# Patient Record
Sex: FEMALE
[De-identification: names, ages, dates, MRNs, and addresses within clinical notes are randomized per-mention and may not be internally consistent; named-entity substitution may affect disease eponyms.]

---

## 2018-04-23 PROBLEM — Z00.00 ENCOUNTER FOR PREVENTIVE HEALTH EXAMINATION: Status: ACTIVE | Noted: 2018-04-23

## 2018-05-04 ENCOUNTER — APPOINTMENT (OUTPATIENT)
Dept: ENDOCRINOLOGY | Facility: CLINIC | Age: 28
End: 2018-05-04
Payer: COMMERCIAL

## 2018-05-04 ENCOUNTER — APPOINTMENT (OUTPATIENT)
Dept: ENDOCRINOLOGY | Facility: CLINIC | Age: 28
End: 2018-05-04

## 2018-05-04 VITALS
SYSTOLIC BLOOD PRESSURE: 117 MMHG | DIASTOLIC BLOOD PRESSURE: 78 MMHG | WEIGHT: 100 LBS | HEIGHT: 61.5 IN | HEART RATE: 101 BPM | BODY MASS INDEX: 18.64 KG/M2

## 2018-05-04 DIAGNOSIS — D68.51 ACTIVATED PROTEIN C RESISTANCE: ICD-10-CM

## 2018-05-04 PROCEDURE — 99204 OFFICE O/P NEW MOD 45 MIN: CPT

## 2018-05-07 ENCOUNTER — MOBILE ON CALL (OUTPATIENT)
Age: 28
End: 2018-05-07

## 2018-05-08 LAB
T3 SERPL-MCNC: 130 NG/DL
T4 FREE SERPL-MCNC: 1.4 NG/DL
T4 SERPL-MCNC: 7.7 UG/DL
THYROGLOB AB SERPL-ACNC: <20 IU/ML
THYROPEROXIDASE AB SERPL IA-ACNC: <10 IU/ML
TSH SERPL-ACNC: 2.37 UIU/ML

## 2018-08-02 ENCOUNTER — RX RENEWAL (OUTPATIENT)
Age: 28
End: 2018-08-02

## 2018-12-06 ENCOUNTER — RX RENEWAL (OUTPATIENT)
Age: 28
End: 2018-12-06

## 2018-12-19 ENCOUNTER — APPOINTMENT (OUTPATIENT)
Dept: ENDOCRINOLOGY | Facility: CLINIC | Age: 28
End: 2018-12-19
Payer: COMMERCIAL

## 2018-12-19 VITALS
BODY MASS INDEX: 21.25 KG/M2 | WEIGHT: 114 LBS | DIASTOLIC BLOOD PRESSURE: 69 MMHG | HEIGHT: 61.5 IN | SYSTOLIC BLOOD PRESSURE: 108 MMHG | HEART RATE: 104 BPM

## 2018-12-19 PROCEDURE — 99214 OFFICE O/P EST MOD 30 MIN: CPT

## 2018-12-20 LAB
T4 FREE SERPL-MCNC: 1.2 NG/DL
TSH SERPL-ACNC: 0.95 UIU/ML

## 2019-01-08 ENCOUNTER — RX RENEWAL (OUTPATIENT)
Age: 29
End: 2019-01-08

## 2019-04-17 ENCOUNTER — APPOINTMENT (OUTPATIENT)
Dept: ENDOCRINOLOGY | Facility: CLINIC | Age: 29
End: 2019-04-17
Payer: COMMERCIAL

## 2019-04-17 VITALS
SYSTOLIC BLOOD PRESSURE: 111 MMHG | HEIGHT: 61.5 IN | WEIGHT: 106 LBS | HEART RATE: 95 BPM | BODY MASS INDEX: 19.76 KG/M2 | DIASTOLIC BLOOD PRESSURE: 70 MMHG

## 2019-04-17 PROCEDURE — 99214 OFFICE O/P EST MOD 30 MIN: CPT

## 2019-04-17 RX ORDER — CALCIUM CITRATE/VITAMIN D3 315MG-6.25
TABLET ORAL
Refills: 0 | Status: DISCONTINUED | COMMUNITY
End: 2019-04-17

## 2019-04-17 RX ORDER — LEVOTHYROXINE SODIUM 0.05 MG/1
50 TABLET ORAL
Qty: 34 | Refills: 1 | Status: DISCONTINUED | COMMUNITY
Start: 2018-08-01 | End: 2019-04-17

## 2019-04-18 LAB
T4 FREE SERPL-MCNC: 1.3 NG/DL
TSH SERPL-ACNC: 1.64 UIU/ML

## 2019-04-18 NOTE — PHYSICAL EXAM
[Alert] : alert [No Acute Distress] : no acute distress [Healthy Appearance] : healthy appearance [Normal Sclera/Conjunctiva] : normal sclera/conjunctiva [Normal Oropharynx] : the oropharynx was normal [No Neck Mass] : no neck mass was observed [Supple] : the neck was supple [Thyroid Not Enlarged] : the thyroid was not enlarged [No LAD] : no lymphadenopathy [No Thyroid Nodules] : there were no palpable thyroid nodules [Normal Rate and Effort] : normal respiratory rhythm and effort [Clear to Auscultation] : lungs were clear to auscultation bilaterally [Normal Rate] : heart rate was normal  [Normal S1, S2] : normal S1 and S2 [Regular Rhythm] : with a regular rhythm [No Edema] : there was no peripheral edema [No Stigmata of Cushings Syndrome] : no stigmata of cushings syndrome [Normal Gait] : normal gait [Normal Insight/Judgement] : insight and judgment were intact [Kyphosis] : no kyphosis present [Acanthosis Nigricans] : no acanthosis nigricans [de-identified] : no moon facies, no supraclavicular fat pads=

## 2019-04-18 NOTE — DATA REVIEWED
[FreeTextEntry1] : Laboratories (March 30, 2018) reviewed and significant for:\par TSH 5.240 mIU/mL\par Free T4 1.41 ng/dL

## 2019-04-18 NOTE — ASSESSMENT
[FreeTextEntry1] : Subclinical hypothyroidism. She was diagnosed with subclinical hypothyroidism at around 6 weeks gestation. She was clinically and biochemically euthyroid on her regimen of levothyroxine during pregnancy. She was induced March 14, 2019 and gave birth to a healthy baby girl. She stopped taking levothyroxine on that date. She is not producing sufficient milk and is supplementing with formula. Her doctor and lactation consultant encouraged a visit with me to check thyroid function.  We will check TSH and free T4 today. Further management pending results.\par \par CC:\par Dr. Pasquale Hilton, Fax [phone]

## 2019-04-18 NOTE — ADDENDUM
[FreeTextEntry1] : Recent thyroid function tests within range off levothyroxine; no indication to restart at this time. I discussed these reassuring results with Ms. Wright. She will need levothyroxine if she becomes pregnant again in the future and was advised to call me immediately as needed. 4/18/19

## 2019-04-18 NOTE — HISTORY OF PRESENT ILLNESS
[FreeTextEntry1] : Ms. Membreno is a 28 year-old woman with a history of factor V deficiency presenting for follow-up of subclinical hypothyroidism diagnosed in pregnancy. She saw me for an initial visit in May 2018 and last in December.\par \par Subclinical hypothyroidism in pregnancy.\par She was noted to have an elevated TSH of 5.240 mIU/mL with normal free T4 in April 2018, about 2 weeks after D&C for miscarriage.\par Her mother states she took levothyroxine for one year starting at age 8 for growth issues. No subsequent need for levothyroxine in childhood.\par At her initial visit in May 2018, TSH was 2.37 uIU/mL with normal T4/T3 and negative thyroid antibodies. We did not start levothyroxine at that time. \par She called me in August 2018 that TSH was 6.720 uIU/mL at approximately 6 weeks gestation and we started levothyroxine 50 mcg daily. We adjusted her dose to levothyroxine 50 mcg, 1 pill 6 days/week and 2 pills 1 day/week for TSH 2.520 uIU/mL on this dose. TSH values were subsequently at goal throughout pregnancy.\par She was taking levothyroxine in the morning, on an empty stomach, with plain water, and waiting at least 30 minutes before eating. She was taking calcium and a prenatal vitamin and  from levothyroxine by at least 4 hours.\par No history of radiation exposure.\par No family history of thyroid or other autoimmune disease.\par \par Interim History \par See telephone note for interim history. She was induced March 14, 2019 and gave birth to a healthy baby girl. She stopped taking levothyroxine on that date.\par She was producing little milk. We discussed restarting levothyroxine on March 20, however, her milk came in that night and she did not restart. \par She is not producing sufficient milk and is supplementing with formula. Her doctor and lactation consultant encouraged a visit with me to check thyroid function. \par She is now only 6 pounds above her prepregnancy weight. No palpitations, diarrhea/constipation, hair/skin changes, depression/anxiety. \par Medical and surgical history, medications, allergies, social and family history reviewed and updated as needed.

## 2020-05-01 ENCOUNTER — APPOINTMENT (OUTPATIENT)
Dept: ENDOCRINOLOGY | Facility: CLINIC | Age: 30
End: 2020-05-01
Payer: COMMERCIAL

## 2020-05-01 VITALS
SYSTOLIC BLOOD PRESSURE: 117 MMHG | DIASTOLIC BLOOD PRESSURE: 74 MMHG | WEIGHT: 96 LBS | HEIGHT: 61.5 IN | BODY MASS INDEX: 17.89 KG/M2 | HEART RATE: 104 BPM

## 2020-05-01 PROCEDURE — 36415 COLL VENOUS BLD VENIPUNCTURE: CPT

## 2020-05-01 PROCEDURE — 99214 OFFICE O/P EST MOD 30 MIN: CPT | Mod: 25

## 2020-05-01 RX ORDER — ENOXAPARIN SODIUM 300 MG/3ML
INJECTION INTRAVENOUS; SUBCUTANEOUS
Refills: 0 | Status: DISCONTINUED | COMMUNITY
End: 2020-05-01

## 2020-05-08 LAB
25(OH)D3 SERPL-MCNC: 29.2 NG/ML
ALBUMIN SERPL ELPH-MCNC: 5.2 G/DL
ALP BLD-CCNC: 109 U/L
ALT SERPL-CCNC: 9 U/L
ANION GAP SERPL CALC-SCNC: 15 MMOL/L
AST SERPL-CCNC: 13 U/L
BASOPHILS # BLD AUTO: 0.05 K/UL
BASOPHILS NFR BLD AUTO: 0.9 %
BILIRUB SERPL-MCNC: 1.9 MG/DL
BUN SERPL-MCNC: 12 MG/DL
CALCIUM SERPL-MCNC: 9.7 MG/DL
CHLORIDE SERPL-SCNC: 101 MMOL/L
CO2 SERPL-SCNC: 23 MMOL/L
CREAT SERPL-MCNC: 0.56 MG/DL
EOSINOPHIL # BLD AUTO: 0.07 K/UL
EOSINOPHIL NFR BLD AUTO: 1.2 %
GLUCOSE SERPL-MCNC: 50 MG/DL
HCT VFR BLD CALC: 45 %
HGB BLD-MCNC: 14.7 G/DL
IMM GRANULOCYTES NFR BLD AUTO: 0.2 %
LYMPHOCYTES # BLD AUTO: 2.3 K/UL
LYMPHOCYTES NFR BLD AUTO: 39.9 %
MAN DIFF?: NORMAL
MCHC RBC-ENTMCNC: 29.1 PG
MCHC RBC-ENTMCNC: 32.7 GM/DL
MCV RBC AUTO: 88.9 FL
MONOCYTES # BLD AUTO: 0.31 K/UL
MONOCYTES NFR BLD AUTO: 5.4 %
NEUTROPHILS # BLD AUTO: 3.03 K/UL
NEUTROPHILS NFR BLD AUTO: 52.4 %
PLATELET # BLD AUTO: 292 K/UL
POTASSIUM SERPL-SCNC: 3.9 MMOL/L
PROT SERPL-MCNC: 7.5 G/DL
RBC # BLD: 5.06 M/UL
RBC # FLD: 11.9 %
SODIUM SERPL-SCNC: 139 MMOL/L
T4 FREE SERPL-MCNC: 1.5 NG/DL
TSH SERPL-ACNC: 3.53 UIU/ML
VIT B12 SERPL-MCNC: 520 PG/ML
WBC # FLD AUTO: 5.77 K/UL

## 2021-02-08 NOTE — HISTORY OF PRESENT ILLNESS
[FreeTextEntry1] : Ms. Membreno is a 29 year-old woman with a history of factor V deficiency presenting for follow-up of subclinical hypothyroidism diagnosed during her last pregnancy. She saw me for an initial visit in May 2018 and last in April 2019.\par \par Subclinical hypothyroidism.\par Her mother stated she took levothyroxine for one year starting at age 6 for growth issues. No subsequent need for levothyroxine in childhood.\par She was noted to have an elevated TSH of 5.240 mIU/mL with normal free T4 in April 2018, about two weeks after D&C for miscarriage.\par At her initial visit in May 2018, TSH was 2.37 uIU/mL with normal T4/T3 and negative thyroid antibodies. We did not start levothyroxine at that time. \par She called me in August 2018 that TSH was 6.720 uIU/mL at approximately 6 weeks gestation and we started levothyroxine 50 mcg daily. We adjusted her dose to levothyroxine 50 mcg, 1 pill 6 days/week and 2 pills 1 day/week for TSH 2.520 uIU/mL on this dose. TSH values were subsequently at goal throughout pregnancy. We discontinued levothyroxine in the postpartum period, and she was subsequently clinically and biochemically euthyroid. \par No history of radiation exposure.\par No family history of thyroid or other autoimmune disease.\par \par Interim History \par Her baby girl is 14 months old. She is crawling and pulling herself up but not yet walking. She wants to plan to become pregnant again. \par No change in weight, palpitations, diarrhea/constipation, hair/skin changes, depression/anxiety. \par Medical and surgical history, medications, allergies, social and family history reviewed and updated as needed.

## 2021-02-08 NOTE — ADDENDUM
[FreeTextEntry1] : Recent test results as below; discussed with Ms. Wright. TSH 3.53 uIU/mL; no compelling indication to restart levothyroxine at this time but would restart immediately if she becomes pregnant. Glucose low, likely due to prolonged processing time in the laboratory since she was asymptomatic of hypoglycemia at her appointment. Total bilirubin borderline elevated and recommend follow-up with primary care. Albumin borderline elevated but not of concern. ALT borderline low but not of concern. Other test results within range. 5/08/20\par \par Ms. Wright has been trying to become pregnant since March and has not been successful. Her gynecologist recently checked TSH and noted it to be above 2.5 uIU/mL but within normal; he attributed her issues with becoming pregnant to her thyroid. We can certainly restart levothyroxine 50 mcg daily for now with plan to repeat TSH in 6 weeks; I advised she continue to follow up regarding other potential causes for difficulty conceiving. 9/29/20\par \par Recent TSH within range on levothyroxine 50 mcg daily; discussed with Ms. Wright. We will continue this regimen for now. I advised her to call me immediately if she becomes pregnant. 11/11/10\par \par Ms. Wright called to let me know she is pregnant. We will adjust levothyroxine to 50 mcg, 1 pill 5 days/week and 2 pills 2 days/week (average daily dose 64 mcg) and plan to repeat TSH in 5 weeks. 12/11/20\par \par Ms. Wright had a miscarriage at 5 weeks. She is pregnant again, although with low heartbeat. Recent TSH 0.674 uIU/mL. 2/08/21\par \par Laboratories (November 10, 2020) reviewed and significant for: \par TSH 0.923 uIU/mL

## 2021-02-08 NOTE — PHYSICAL EXAM
[Alert] : alert [Healthy Appearance] : healthy appearance [No Acute Distress] : no acute distress [Normal Sclera/Conjunctiva] : normal sclera/conjunctiva [Normal Oropharynx] : the oropharynx was normal [No Neck Mass] : no neck mass was observed [No LAD] : no lymphadenopathy [Supple] : the neck was supple [Thyroid Not Enlarged] : the thyroid was not enlarged [No Thyroid Nodules] : no palpable thyroid nodules [No Respiratory Distress] : no respiratory distress [Clear to Auscultation] : lungs were clear to auscultation bilaterally [Normal S1, S2] : normal S1 and S2 [Normal Rate] : heart rate was normal [Regular Rhythm] : with a regular rhythm [No Stigmata of Cushings Syndrome] : no stigmata of Cushings Syndrome [Normal Insight/Judgement] : insight and judgment were intact [Kyphosis] : no kyphosis present [Acanthosis Nigricans] : no acanthosis nigricans [de-identified] : no moon facies, no supraclavicular fat pads

## 2021-02-08 NOTE — ASSESSMENT
[FreeTextEntry1] : Subclinical hypothyroidism. She was diagnosed with subclinical hypothyroidism during her last pregnancy. We discontinued levothyroxine in the postpartum period, and she was subsequently clinically and biochemically euthyroid. She is considering another pregnancy. We will check thyroid function tests now. She will call the office immediately if she becomes pregnant.

## 2021-02-09 ENCOUNTER — NON-APPOINTMENT (OUTPATIENT)
Age: 31
End: 2021-02-09

## 2021-03-10 ENCOUNTER — RX RENEWAL (OUTPATIENT)
Age: 31
End: 2021-03-10

## 2021-04-14 ENCOUNTER — APPOINTMENT (OUTPATIENT)
Dept: ENDOCRINOLOGY | Facility: CLINIC | Age: 31
End: 2021-04-14
Payer: COMMERCIAL

## 2021-04-14 VITALS
DIASTOLIC BLOOD PRESSURE: 60 MMHG | WEIGHT: 98 LBS | BODY MASS INDEX: 18.27 KG/M2 | HEIGHT: 61.5 IN | SYSTOLIC BLOOD PRESSURE: 92 MMHG | HEART RATE: 92 BPM

## 2021-04-14 PROCEDURE — 99072 ADDL SUPL MATRL&STAF TM PHE: CPT

## 2021-04-14 PROCEDURE — 99214 OFFICE O/P EST MOD 30 MIN: CPT

## 2021-04-15 LAB
T4 FREE SERPL-MCNC: 1.5 NG/DL
TSH SERPL-ACNC: 1.56 UIU/ML

## 2021-09-15 ENCOUNTER — RX RENEWAL (OUTPATIENT)
Age: 31
End: 2021-09-15

## 2022-08-02 NOTE — ADDENDUM
[FreeTextEntry1] : TSH 1.56 uIU/mL and recommend continuing current regimen of levothyroxine; discussed with Ms. Wright. 4/15/21\par \par Ms. Wright is at 36 weeks gestation; she is planned for an induction at 39 weeks. Thyroid function tests have been within range per her report. I advised she continue levothyroxine for about a week postpartum until breastfeeding is established, and that she can then discontinue with plan to repeat thyroid function tests 6-8 weeks postpartum. 8/25/21\par \par Ms. Wright is pregnant again at 8 weeks 5 days gestation. Her last menstrual period was June 2, 2022. Her estimated due date is March 9, 2023. She was started on levothyroxine 25 mcg daily two weeks ago by her obstetrician. I recommended she resume her dose from last pregnancy, levothyroxine 50 mcg, 1 pill 5 days/week and 2 pills 1 day/week. We will plan to repeat thyroid function tests in six weeks prior to her upcoming appointment with me in September. 8/02/22\par \par Laboratories (July 19, 2022) reviewed and significant for: \par TSH 2.940 uIU/mL

## 2022-08-02 NOTE — HISTORY OF PRESENT ILLNESS
[FreeTextEntry1] : Ms. Membreno is a 30 year-old woman with a history of factor V deficiency presenting for follow-up of subclinical hypothyroidism in pregnancy. She saw me for an initial visit in May 2018 and last in May 2020.\par \par Subclinical hypothyroidism.\par Her mother stated she took levothyroxine for one year starting at age 8 for growth issues. No subsequent need for levothyroxine in childhood.\par She was noted to have an elevated TSH of 5.240 mIU/mL with normal free T4 in April 2018, about two weeks after D&C for miscarriage.\par At her initial visit in May 2018, TSH was 2.37 uIU/mL with normal T4/T3 and negative thyroid antibodies. We did not start levothyroxine at that time. \par She called me in August 2018 that TSH was 6.720 uIU/mL at approximately 6 weeks gestation and we started levothyroxine 50 mcg daily. We adjusted her dose to levothyroxine 50 mcg, 1 pill 6 days/week and 2 pills 1 day/week for TSH 2.520 uIU/mL on this dose. TSH values were subsequently at goal throughout pregnancy. We discontinued levothyroxine in the postpartum period, and she was subsequently clinically and biochemically euthyroid. \par She restarted levothyroxine 50 mcg daily in September 2020 due to issues conceiving. We adjusted her dose to levothyroxine 50 mcg, 1 pill 6 days/week and 2 pills 1 day/week during pregnancy.\par She is taking levothyroxine in the morning, on an empty stomach, with plain water, and waiting at least 30 minutes before eating. She is taking a prenatal vitamin and  from levothyroxine by at least four hours. \par No history of radiation exposure.\par No family history of thyroid or other autoimmune disease.\par \par Interim History \par Her last menstrual period was December 14, 2020. She is at 17 weeks 2 days gestation. \par She has been taking levothyroxine to 50 mcg, 1 pill 5 days/week and 2 pills 2 days/week (average daily dose 64 mcg). Last TSH 0.674 uIU/mL in February 2021 per her report.\par Her daughter turned 2 years old.\par Nausea from the first trimester has resolved. \par Medical and surgical history, medications, allergies, social and family history reviewed and updated as needed.

## 2022-08-02 NOTE — ASSESSMENT
[FreeTextEntry1] : Subclinical hypothyroidism in pregnancy. She was diagnosed with subclinical hypothyroidism in August 2018 during pregnancy. We discontinued levothyroxine in the postpartum period, and she was subsequently clinically and biochemically euthyroid. We restarted levothyroxine in September 2020 due to issues conceiving, and have adjusted her dose in pregnancy. She has been clinically and biochemically euthyroid on her current regimen of levothyroxine. We reviewed proper use and compliance with levothyroxine. \par Continue levothyroxine 50 mcg, 1 pill 5 days/week and 2 pills 2 days/week (average daily dose 64 mcg) pending TSH for goal 0.5-3.0 uIU/mL\par \par CC:\par Dr. Pasquale Hilton, Fax [phone]

## 2022-08-02 NOTE — PHYSICAL EXAM
[Alert] : alert [Healthy Appearance] : healthy appearance [No Acute Distress] : no acute distress [Normal Sclera/Conjunctiva] : normal sclera/conjunctiva [No Neck Mass] : no neck mass was observed [No LAD] : no lymphadenopathy [Supple] : the neck was supple [Thyroid Not Enlarged] : the thyroid was not enlarged [No Thyroid Nodules] : no palpable thyroid nodules [No Stigmata of Cushings Syndrome] : no stigmata of Cushings Syndrome [Normal Insight/Judgement] : insight and judgment were intact [Normal Hearing] : hearing was normal [Kyphosis] : no kyphosis present [Acanthosis Nigricans] : no acanthosis nigricans [de-identified] : no moon facies, no supraclavicular fat pads

## 2022-09-28 ENCOUNTER — APPOINTMENT (OUTPATIENT)
Dept: ENDOCRINOLOGY | Facility: CLINIC | Age: 32
End: 2022-09-28

## 2022-09-28 PROCEDURE — 99214 OFFICE O/P EST MOD 30 MIN: CPT | Mod: 95

## 2022-09-28 NOTE — DATA REVIEWED
[FreeTextEntry1] : Laboratories (September 1, 2022) reviewed and significant for: \par TSH 1.440 uIU/mL\par Free T4 1.66 ng/dL\par \par Laboratories (July 19, 2022) reviewed and significant for: \par TSH 2.940 uIU/mL

## 2022-09-28 NOTE — HISTORY OF PRESENT ILLNESS
[Home] : at home, [unfilled] , at the time of the visit. [Medical Office: ([facility])___] : at the medical office located in  [Verbal consent obtained from patient] : the patient, [unfilled] [FreeTextEntry1] : Ms. Wright is a 31 year-old woman with a history of factor V deficiency presenting for follow-up of subclinical hypothyroidism in pregnancy. She saw me for an initial visit in May 2018 and last in April 2021.\par \par Subclinical hypothyroidism.\par Her mother stated she took levothyroxine for one year starting at age 8 for growth issues. No subsequent need for levothyroxine in childhood.\par She was noted to have an elevated TSH of 5.240 mIU/mL with normal free T4 in April 2018, about two weeks after dilation and curettage for miscarriage.\par At her initial visit in May 2018, TSH was 2.37 uIU/mL with normal T4/T3 and negative thyroid antibodies. We did not start levothyroxine at that time. \par She called me in August 2018 that TSH was 6.720 uIU/mL at approximately 6 weeks gestation and we started levothyroxine 50 mcg daily. We adjusted her dose to levothyroxine 50 mcg, 1 pill 6 days/week and 2 pills 1 day/week for TSH 2.520 uIU/mL on this dose. TSH values were subsequently at goal throughout pregnancy. We discontinued levothyroxine in the postpartum period, and she was subsequently clinically and biochemically euthyroid. \par She restarted levothyroxine 50 mcg daily in September 2020 due to issues conceiving. We adjusted her dose to levothyroxine 50 mcg, 1 pill 6 days/week and 2 pills 1 day/week during pregnancy. We discontinued levothyroxine in the postpartum period, and she was subsequently clinically and biochemically euthyroid. \par We restarted levothyroxine 50 mcg, 1 pill 5 days/week and 2 pills 2 day/week in August 2022 with her third pregnancy. \par She is taking levothyroxine in the morning, on an empty stomach, with plain water, and waiting at least 30 minutes before eating. She is taking a prenatal vitamin and  from levothyroxine by at least four hours. \par No history of radiation exposure.\par No family history of thyroid or other autoimmune disease.\par \par Interim History \par She called me on August 2nd to inform me that she is pregnant. Her last menstrual period was June 2, 2022. She is currently at 16 weeks 6 days gestation. Her estimated due date is March 9, 2023. \par I recommended she resume her levothyroxine dose from her last pregnancy, levothyroxine 50 mcg, 1 pill 5 days/week and 2 pills 2 day/week (average daily dose 64 mcg). Recent TSH 1.440 uIU/mL.\par Her daughters are 3 years old and 1 year old.\par Nausea from the first trimester has resolved. She feels well. \par Medical and surgical history, medications, allergies, social and family history reviewed and updated as needed.

## 2022-09-28 NOTE — ASSESSMENT
[FreeTextEntry1] : Subclinical hypothyroidism in pregnancy. She was diagnosed with subclinical hypothyroidism in August 2018 during pregnancy. She again required levothyroxine with her second pregnancy. We restarted levothyroxine in August 2022 with her third pregnancy. She has been biochemically euthyroid on her current regimen of levothyroxine. We have reviewed proper use and compliance with levothyroxine. \par Continue levothyroxine 50 mcg, 1 pill 5 days/week and 2 pills 2 days/week (average daily dose 64 mcg) for goal TSH 0.3-3.0 uIU/mL\par Plan to monitor TSH in 8-12 weeks\par \par CC:\par Dr. Pasquale Hilton, Fax [phone]

## 2023-01-16 ENCOUNTER — RX RENEWAL (OUTPATIENT)
Age: 33
End: 2023-01-16

## 2023-02-02 ENCOUNTER — APPOINTMENT (OUTPATIENT)
Dept: ENDOCRINOLOGY | Facility: CLINIC | Age: 33
End: 2023-02-02
Payer: COMMERCIAL

## 2023-02-02 DIAGNOSIS — Z34.90 ENCOUNTER FOR SUPERVISION OF NORMAL PREGNANCY, UNSPECIFIED, UNSPECIFIED TRIMESTER: ICD-10-CM

## 2023-02-02 PROCEDURE — 99214 OFFICE O/P EST MOD 30 MIN: CPT | Mod: 95

## 2023-03-23 ENCOUNTER — APPOINTMENT (OUTPATIENT)
Dept: ENDOCRINOLOGY | Facility: CLINIC | Age: 33
End: 2023-03-23

## 2024-06-11 NOTE — DATA REVIEWED
[FreeTextEntry1] : Laboratories (December 8, 2022) reviewed and significant for: \par TSH 1.130 uIU/mL\par Free T4 1.29 ng/dL\par \par Laboratories (September 1, 2022) reviewed and significant for: \par TSH 1.440 uIU/mL\par Free T4 1.66 ng/dL\par \par Laboratories (July 19, 2022) reviewed and significant for: \par TSH 2.940 uIU/mL

## 2024-06-11 NOTE — HISTORY OF PRESENT ILLNESS
[Home] : at home, [unfilled] , at the time of the visit. [Medical Office: ([facility])___] : at the medical office located in  [Verbal consent obtained from patient] : the patient, [unfilled] [FreeTextEntry1] : Ms. Wright is a 32 year-old woman with a history of factor V deficiency presenting for follow-up of subclinical hypothyroidism in pregnancy. She saw me for an initial visit in May 2018 and last in September 2022.\par \par Subclinical hypothyroidism.\par Her mother stated she took levothyroxine for one year starting at age 6 for growth issues. No subsequent need for levothyroxine in childhood.\par She was noted to have an elevated TSH of 5.240 mIU/mL with normal free T4 in April 2018, about two weeks after dilation and curettage for miscarriage.\par At her initial visit in May 2018, TSH was 2.37 uIU/mL with normal T4/T3 and negative thyroid antibodies. We did not start levothyroxine at that time. \par She called me in August 2018 that TSH was 6.720 uIU/mL at approximately 6 weeks gestation and we started levothyroxine 50 mcg daily. We adjusted her dose to levothyroxine 50 mcg, 1 pill 6 days/week and 2 pills 1 day/week for TSH 2.520 uIU/mL on this dose. TSH values were subsequently at goal throughout pregnancy. We discontinued levothyroxine in the postpartum period, and she was subsequently clinically and biochemically euthyroid. \par She restarted levothyroxine 50 mcg daily in September 2020 due to issues conceiving. We adjusted her dose to levothyroxine 50 mcg, 1 pill 6 days/week and 2 pills 1 day/week during pregnancy. We discontinued levothyroxine in the postpartum period, and she was subsequently clinically and biochemically euthyroid. \par We restarted levothyroxine 50 mcg, 1 pill 5 days/week and 2 pills 2 day/week in August 2022 with her third pregnancy. \par She is taking levothyroxine in the morning, on an empty stomach, with plain water, and waiting at least 30 minutes before eating. She is taking a prenatal vitamin and  from levothyroxine by at least four hours. \par No history of radiation exposure.\par No family history of thyroid or other autoimmune disease.\par \par Interim History \par Her last menstrual period was June 2, 2022. She is currently at 35 weeks gestation. Her estimated due date is March 9, 2023. \par She is currently taking levothyroxine 50 mcg, 1 pill 5 days/week and 2 pills 2 day/week (average daily dose 64 mcg). TSH 1.130 uIU/mL in December 2022; she had TSH testing this week, although the report is not available. \par Her daughters are 3 years old and 1 year old.\par She feels well. No acute issues. \par Medical and surgical history, medications, allergies, social and family history reviewed and updated as needed.

## 2024-06-11 NOTE — ADDENDUM
[FreeTextEntry1] : Recent TSH within the goal range as below; plan as above. 2/07/23  Ms. Rocha has been off levothyroxine therapy. She will be considering pregnancy in the near future. We will plan to restart levothyroxine therapy at her upcoming appointment. 6/11/24  Laboratories (Leonie 3, 2024) reviewed and significant for: TSH 4.080 uIU/mL  Laboratories (January 30, 2023) reviewed and significant for:  TSH 0.931 uIU/mL (normal: 0.5-3.0 in third trimester)

## 2024-06-11 NOTE — ASSESSMENT
[FreeTextEntry1] : Subclinical hypothyroidism in pregnancy. She was diagnosed with subclinical hypothyroidism in August 2018 during pregnancy. She again required levothyroxine with her second pregnancy. We restarted levothyroxine in August 2022 with her third pregnancy. She has been biochemically euthyroid on her current regimen of levothyroxine; we will request her recent test results. We have reviewed proper use and compliance with levothyroxine. We can discontinue levothyroxine in the postpartum period, with monitoring of TSH six weeks postpartum. \par Continue levothyroxine 50 mcg, 1 pill 5 days/week and 2 pills 2 days/week (average daily dose 64 mcg) for goal TSH 0.3-3.0 uIU/mL\par Discontinue levothyroxine postpartum and monitor TSH after 6 weeks\par \par CC:\par Dr. Pasquale Hilton, Fax [phone]

## 2024-06-20 ENCOUNTER — APPOINTMENT (OUTPATIENT)
Dept: ENDOCRINOLOGY | Facility: CLINIC | Age: 34
End: 2024-06-20
Payer: COMMERCIAL

## 2024-06-20 DIAGNOSIS — L65.9 NONSCARRING HAIR LOSS, UNSPECIFIED: ICD-10-CM

## 2024-06-20 DIAGNOSIS — E03.8 OTHER SPECIFIED HYPOTHYROIDISM: ICD-10-CM

## 2024-06-20 PROCEDURE — 99214 OFFICE O/P EST MOD 30 MIN: CPT

## 2024-06-20 PROCEDURE — G2211 COMPLEX E/M VISIT ADD ON: CPT

## 2024-06-20 RX ORDER — ENOXAPARIN SODIUM 300 MG/3ML
INJECTION INTRAVENOUS; SUBCUTANEOUS
Refills: 0 | Status: DISCONTINUED | COMMUNITY
End: 2024-06-20

## 2024-06-20 RX ORDER — LEVOTHYROXINE SODIUM 0.05 MG/1
50 TABLET ORAL
Qty: 115 | Refills: 1 | Status: ACTIVE | COMMUNITY
Start: 2020-09-29 | End: 1900-01-01

## 2024-06-20 NOTE — ASSESSMENT
[FreeTextEntry1] : Subclinical hypothyroidism. She was diagnosed with subclinical hypothyroidism in August 2018 during pregnancy. She again required levothyroxine with her second and third pregnancies. She is interested in conceiving again and we will restart levothyroxine therapy. We reviewed proper use and compliance with levothyroxine. I have advised her to call me immediately if she becomes pregnant.  Restart levothyroxine 50 mcg, 1 pill 5 days/week and 2 pills 2 days/week (average daily dose 64 mcg) for goal TSH 0.5-2.5 uIU/mL  Hair loss. We will perform laboratory evaluation as below.   CC: Dr. Pasquale Hilton, Fax [phone]

## 2024-06-20 NOTE — HISTORY OF PRESENT ILLNESS
[FreeTextEntry1] : Ms. Wright is a 33 year-old woman with a history of factor V deficiency presenting for follow-up of subclinical hypothyroidism. She saw me for an initial visit in May 2018 and last in February 2023.  Subclinical hypothyroidism. Her mother stated she took levothyroxine for one year starting at age 8 for growth issues. No subsequent need for levothyroxine in childhood. She was noted to have an elevated TSH of 5.240 mIU/mL with normal free T4 in April 2018, about two weeks after dilation and curettage for miscarriage. At her initial visit in May 2018, TSH was 2.37 uIU/mL with normal T4/T3 and negative thyroid antibodies. We did not start levothyroxine at that time.  She called me in August 2018 that TSH was 6.720 uIU/mL at approximately 6 weeks gestation and we started levothyroxine 50 mcg daily. We adjusted her dose to levothyroxine 50 mcg, 1 pill 6 days/week and 2 pills 1 day/week for TSH 2.520 uIU/mL on this dose. TSH values were subsequently at goal throughout pregnancy. We discontinued levothyroxine in the postpartum period, and she was subsequently clinically and biochemically euthyroid.  She restarted levothyroxine 50 mcg daily in September 2020 due to issues conceiving. We adjusted her dose to levothyroxine 50 mcg, 1 pill 6 days/week and 2 pills 1 day/week during pregnancy. We discontinued levothyroxine in the postpartum period, and she was subsequently clinically and biochemically euthyroid.  We restarted levothyroxine 50 mcg, 1 pill 5 days/week and 2 pills 2 day/week in August 2022 with her third pregnancy. She discontinued in the postpartum period.  No history of radiation exposure. No family history of thyroid or other autoimmune disease.  Interim History  She has been off levothyroxine therapy. She is interested in conceiving again soon. Recent TSH 4.080 uIU/mL.  Her daughters are 5 years, 1.5 years, and 16 months old. She has had hair loss. She otherwise feels well. Menstrual periods have been regular. Medical and surgical history, medications, allergies, social and family history reviewed and updated as needed. [Home] : at home, [unfilled] , at the time of the visit. [Medical Office: ([facility])___] : at the medical office located in  [Verbal consent obtained from patient] : the patient, [unfilled]